# Patient Record
Sex: MALE | Race: BLACK OR AFRICAN AMERICAN | NOT HISPANIC OR LATINO | Employment: OTHER | URBAN - METROPOLITAN AREA
[De-identification: names, ages, dates, MRNs, and addresses within clinical notes are randomized per-mention and may not be internally consistent; named-entity substitution may affect disease eponyms.]

---

## 2020-08-20 ENCOUNTER — TELEPHONE (OUTPATIENT)
Dept: GENETICS | Facility: CLINIC | Age: 65
End: 2020-08-20

## 2020-08-20 NOTE — TELEPHONE ENCOUNTER
----- Message from Charmaine Zuniga sent at 8/20/2020  3:08 PM CDT -----  Regarding: Taj Patel  [phone]  Contact: Taj abdul/ [phone]  Needs Advice    Reason for call:David abdul/ Jorge  Communication Preference:Taj requesting a call back    Additional Information:David want to know what's the status on Pt appt/

## 2020-08-24 ENCOUNTER — TELEPHONE (OUTPATIENT)
Dept: GENETICS | Facility: CLINIC | Age: 65
End: 2020-08-24

## 2020-08-24 NOTE — TELEPHONE ENCOUNTER
MARCELLE for pt to give us a call back to schedule an appt. Pt was referred by the [facility].

## 2020-09-10 ENCOUNTER — TELEPHONE (OUTPATIENT)
Dept: GENETICS | Facility: CLINIC | Age: 65
End: 2020-09-10

## 2020-09-10 NOTE — TELEPHONE ENCOUNTER
LM for nurse Aponte informing her that we have tried to contact the pt twice without success to schedule an appt.

## 2020-09-10 NOTE — TELEPHONE ENCOUNTER
----- Message from Xi Bro sent at 9/10/2020  9:06 AM CDT -----  Regarding: apt  Contact: ole  Needs Advice    Reason for call: was referral received via faxed asked nurse Aponte        Communication Preference: [phone] ext [phone] nurse Aponte     Additional Information: referral was faxed 2 times. [facility] . WILL FAX referral again to [phone] .

## 2020-10-08 ENCOUNTER — TELEPHONE (OUTPATIENT)
Dept: GENETICS | Facility: CLINIC | Age: 65
End: 2020-10-08

## 2020-10-08 NOTE — TELEPHONE ENCOUNTER
----- Message from Adamaris Gonsalez sent at 10/8/2020  1:44 PM CDT -----  Contact: [facility] Jessica Davila   Jessica from [facility] would like a call back about a referral

## 2020-10-28 ENCOUNTER — TELEPHONE (OUTPATIENT)
Dept: GENETICS | Facility: CLINIC | Age: 65
End: 2020-10-28

## 2021-05-05 ENCOUNTER — TELEPHONE (OUTPATIENT)
Dept: GENETICS | Facility: CLINIC | Age: 66
End: 2021-05-05

## 2021-05-20 ENCOUNTER — TELEPHONE (OUTPATIENT)
Dept: GENETICS | Facility: CLINIC | Age: 66
End: 2021-05-20

## 2022-02-14 ENCOUNTER — TELEPHONE (OUTPATIENT)
Dept: GENETICS | Facility: CLINIC | Age: 67
End: 2022-02-14
Payer: OTHER GOVERNMENT

## 2022-02-14 NOTE — TELEPHONE ENCOUNTER
LMOV in reference to scheduling pt Genetics appointment from a referral for Z86.010 (ICD-10-CM) - Personal history of colonic polyps on 3/22/22 at 9 am with our Genetics counselor. LM for pt to call clinic back whenever he gets a chance.

## 2022-02-14 NOTE — TELEPHONE ENCOUNTER
----- Message from Uzma Faria MS sent at 2/14/2022 12:27 PM CST -----  GC only   ----- Message -----  From: Daniella Shaffer MA  Sent: 2/14/2022  12:11 PM CST  To: Uzma Faria MS    Does this pt need to be seen by GC or provider? Please advise  /   Z86.010 (ICD-10-CM) - Personal history of colonic polyps  ----- Message -----  From: Radha Peacock  Sent: 2/14/2022  10:12 AM CST  To: [facility] Genetics Clinical Support Staff    Good morning,    The pt listed above is being referred by the [facility] to discuss testing for colorectal cancer/polyposis syndrome due to personal history of colonic polps. I Have placed the referral authorization and the appt can be scheduled directly from the referral in Epic. I have also scanned the referral and records into media manager. Please contact  Arpan at your earliest convenience to schedule his appt.          Thank You,  Radha Linton

## 2022-03-02 ENCOUNTER — TELEPHONE (OUTPATIENT)
Dept: GENETICS | Facility: CLINIC | Age: 67
End: 2022-03-02
Payer: OTHER GOVERNMENT

## 2022-03-02 NOTE — TELEPHONE ENCOUNTER
Spoke with [facility] and [facility]. In reference to scheduling pt for a Genetics appointment from a referral for Z86.010 (ICD-10-CM) - Personal history of colonic polyps on 3/22/22 at 11 am with Uzma Faria. Pt verbalized understanding.

## 2022-03-21 ENCOUNTER — TELEPHONE (OUTPATIENT)
Dept: GENETICS | Facility: CLINIC | Age: 67
End: 2022-03-21
Payer: OTHER GOVERNMENT

## 2022-03-21 NOTE — TELEPHONE ENCOUNTER
Spoke with pt in reference to rescheduling his Genetics appointment from tomorrow to 4/25/22 at 11 am due to Uzma being out sick. Pt verbalized understanding.

## 2022-03-24 ENCOUNTER — TELEPHONE (OUTPATIENT)
Dept: GENETICS | Facility: CLINIC | Age: 67
End: 2022-03-24
Payer: OTHER GOVERNMENT

## 2022-03-25 ENCOUNTER — TELEPHONE (OUTPATIENT)
Dept: GENETICS | Facility: CLINIC | Age: 67
End: 2022-03-25
Payer: OTHER GOVERNMENT

## 2022-03-25 NOTE — TELEPHONE ENCOUNTER
Spoke with pt in reference to his Genetics appointment scheduled for today at 11 am with Uzma Faria. Pt stated that he got lost and has been all over the hospital looking for our location. I informed pt that we are in the [facility] building [address] on the 2nd floor. I ask pt if he would like to come now and he stated that he was on his way home. I rescheduled his appointment for 5/6/22 at 11 am. Pt verbalized understanding.

## 2022-05-05 ENCOUNTER — TELEPHONE (OUTPATIENT)
Dept: GENETICS | Facility: CLINIC | Age: 67
End: 2022-05-05
Payer: OTHER GOVERNMENT

## 2022-05-06 ENCOUNTER — TELEPHONE (OUTPATIENT)
Dept: GENETICS | Facility: CLINIC | Age: 67
End: 2022-05-06
Payer: OTHER GOVERNMENT

## 2022-05-06 ENCOUNTER — OFFICE VISIT (OUTPATIENT)
Dept: GENETICS | Facility: CLINIC | Age: 67
End: 2022-05-06
Payer: OTHER GOVERNMENT

## 2022-05-06 VITALS — HEIGHT: 73 IN | BODY MASS INDEX: 26.09 KG/M2 | WEIGHT: 196.88 LBS

## 2022-05-06 DIAGNOSIS — K63.5 POLYP OF COLON, UNSPECIFIED PART OF COLON, UNSPECIFIED TYPE: ICD-10-CM

## 2022-05-06 DIAGNOSIS — K63.5 POLYP OF COLON, UNSPECIFIED PART OF COLON, UNSPECIFIED TYPE: Primary | ICD-10-CM

## 2022-05-06 PROCEDURE — 99499 UNLISTED E&M SERVICE: CPT | Mod: S$PBB,,, | Performed by: MEDICAL GENETICS

## 2022-05-06 PROCEDURE — 99999 PR PBB SHADOW E&M-EST. PATIENT-LVL III: ICD-10-PCS | Mod: PBBFAC,,,

## 2022-05-06 PROCEDURE — 99499 NO LOS: ICD-10-PCS | Mod: S$PBB,,, | Performed by: MEDICAL GENETICS

## 2022-05-06 PROCEDURE — 99213 OFFICE O/P EST LOW 20 MIN: CPT | Mod: PBBFAC | Performed by: GENETIC COUNSELOR, MS

## 2022-05-06 PROCEDURE — 99999 PR PBB SHADOW E&M-EST. PATIENT-LVL III: CPT | Mod: PBBFAC,,,

## 2022-05-06 PROCEDURE — 96040 PR GENETIC COUNSELING, EACH 30 MIN: ICD-10-PCS | Mod: ,,, | Performed by: MEDICAL GENETICS

## 2022-05-06 PROCEDURE — 96040 PR GENETIC COUNSELING, EACH 30 MIN: CPT | Mod: ,,, | Performed by: MEDICAL GENETICS

## 2022-05-06 RX ORDER — CHLORTHALIDONE 25 MG/1
25 TABLET ORAL
COMMUNITY
Start: 2021-08-10

## 2022-05-06 RX ORDER — ALBUTEROL SULFATE 90 UG/1
AEROSOL, METERED RESPIRATORY (INHALATION)
COMMUNITY
Start: 2021-08-10

## 2022-05-06 RX ORDER — LISINOPRIL 40 MG/1
40 TABLET ORAL
COMMUNITY
Start: 2021-10-12

## 2022-05-06 RX ORDER — POLYETHYLENE GLYCOL 3350 17 G/17G
POWDER, FOR SOLUTION ORAL
COMMUNITY
Start: 2021-06-24

## 2022-05-06 RX ORDER — ATORVASTATIN CALCIUM 80 MG/1
40 TABLET, FILM COATED ORAL
COMMUNITY
Start: 2021-10-12

## 2022-05-06 RX ORDER — CARVEDILOL 12.5 MG/1
6.25 TABLET ORAL
COMMUNITY
Start: 2022-04-28

## 2022-05-06 RX ORDER — NAPROXEN SODIUM 220 MG/1
81 TABLET, FILM COATED ORAL
COMMUNITY
Start: 2021-10-12

## 2022-05-06 RX ORDER — METFORMIN HYDROCHLORIDE 500 MG/1
1000 TABLET, EXTENDED RELEASE ORAL
COMMUNITY
Start: 2021-10-12

## 2022-05-06 NOTE — TELEPHONE ENCOUNTER
Spoke to Mr. Antony because he didn't have his blood drawn after today's visit. He plans to come on Monday.

## 2022-05-06 NOTE — PROGRESS NOTES
Mariano Antony Sr.   DOS: 2022  : 1955  MRN: [id number]    REFERRING MD: [facility]     REASON FOR CONSULT: Our Medical Genetic Service was asked to provide genetic counseling for this 66-year-old male with colon polyps.     PRESENT ILLNESS: Mr. Antony has no personal history of cancer. He has had >12 adenoma or proximal hyperplastic/serrated adenomas. His most recent colonoscopy was in January.  He has never had an endoscopy. He has annual prostate cancer screening.     FAMILY HISTORY: Mr. Antony has two children. He does not know if theyve had colonoscopies. His sister  in her 40s, but not from cancer. His brother gets colonoscopies every 2 years, presumably due to polyps but he doesnt know for sure. His mother  at age 65 and his father  at age 67. Family history is noncontributory.         IMPRESSION: Mr. Antony is a 66-year-old male with >12 adenomas.      After reviewing the family history, we discussed the possibility of a hereditary cancer syndrome. About 70% of cancers are sporadic, or due to chance, age, and environmental factors. About 10-15% of cancers are familial and are characterized by multiple individuals in a family affected with cancer. Only 5-10% of cancers are hereditary, and caused by a genetic predisposition syndrome, such as Hereditary Breast and Ovarian Cancer Syndrome (HBOC) (BRCA1, BRCA2) or Hereditary Non-Polyposis Colon Cancer (HNPCC)/Rosario syndrome (MLH1, MSH2, MSH6, PMS2, EPCAM).     Because there are several genes that increase the susceptibility to bowel cancer, prostate cancer, and melanoma, we discussed the benefits, limitations, and possible results of a genetic testing panel. The 47 gene common hereditary cancers panel from [facility] (APC, ROSA, AXIN2, BARD1, BMPR1A, BRCA1, BRCA2, BRIP1, CDH1, CDK4, CDKN2A, CHEK2, CTNNA1, DICER1, EPCAM, GREM1, HOXB13, KIT, MEN1, MLH1, MSH2, MSH3, MSH6, MUTYH, NBN, NF1, NTHL1, PALB2, PDGFRA, PMS2, POLD1, POLE,  PTEN, RAD50, RAD51C, RAD51D, SDHA, SDHB, SDHC, SDHD, SMAD4, SMARCA4, STK11, TP53, TSC1, TSC2, VHL) is a comprehensive genetic test for multiple cancer types. We also discussed the option to start with a smaller panel and then reflex to a larger panel.     If Mr. Antony tests positive for a gene tested, his children and siblings would be at 50% risk. If negative, or a variant of uncertain significance (VUS) is found, his relatives should still have screening based off of his history.     We also spent time discussing the Genetic Information Nondiscrimination Act (RUDDY) that protects individuals from discrimination on the basis of genetic information from medical insurance providers and employers. The law does not cover life insurance or long-term disability insurance, however. Genetic testing in a person without a history of cancer can also provide undue worry and stress during testing, return of results, and subsequent screening if necessary. Mr. Antony understood these implications and consented to testing.    RECOMMENDATIONS:  1. 47 gene common hereditary cancer panel from [facility]  2. Follow-up once results are returned     TIME SPENT: 25 minutes     Uzma Faria, MPH, MS, [facility]  Certified Genetic Counselor  [facility]     Reyna Cummings M.D.                                                                                   Medical Geneticist                                                                                                               [facility]

## 2022-05-09 ENCOUNTER — LAB VISIT (OUTPATIENT)
Dept: LAB | Facility: HOSPITAL | Age: 67
End: 2022-05-09
Attending: MEDICAL GENETICS
Payer: OTHER GOVERNMENT

## 2022-05-09 DIAGNOSIS — K63.5 POLYP OF COLON, UNSPECIFIED PART OF COLON, UNSPECIFIED TYPE: ICD-10-CM

## 2022-05-09 PROCEDURE — 36415 COLL VENOUS BLD VENIPUNCTURE: CPT | Performed by: MEDICAL GENETICS

## 2022-06-06 LAB
GENETIC COUNSELING?: YES
GENSO SPECIMEN TYPE: NORMAL
MISCELLANEOUS GENETIC TEST NAME: NORMAL
PARTENTAL OR SIBLING TESTING?: NO
REFERENCE LAB: NORMAL
TEST RESULT: NORMAL

## 2022-06-17 ENCOUNTER — TELEPHONE (OUTPATIENT)
Dept: GENETICS | Facility: CLINIC | Age: 67
End: 2022-06-17
Payer: OTHER GOVERNMENT

## 2022-06-17 ENCOUNTER — PATIENT MESSAGE (OUTPATIENT)
Dept: GENETICS | Facility: CLINIC | Age: 67
End: 2022-06-17
Payer: OTHER GOVERNMENT